# Patient Record
Sex: MALE | Race: OTHER | Employment: FULL TIME | URBAN - METROPOLITAN AREA
[De-identification: names, ages, dates, MRNs, and addresses within clinical notes are randomized per-mention and may not be internally consistent; named-entity substitution may affect disease eponyms.]

---

## 2019-10-03 ENCOUNTER — HOSPITAL ENCOUNTER (EMERGENCY)
Age: 54
Discharge: HOME | End: 2019-10-03
Attending: EMERGENCY MEDICINE

## 2019-10-03 ENCOUNTER — APPOINTMENT (OUTPATIENT)
Dept: GENERAL RADIOLOGY | Age: 54
End: 2019-10-03

## 2019-10-03 VITALS
HEART RATE: 76 BPM | TEMPERATURE: 98.2 F | DIASTOLIC BLOOD PRESSURE: 81 MMHG | OXYGEN SATURATION: 96 % | WEIGHT: 200 LBS | HEIGHT: 67 IN | SYSTOLIC BLOOD PRESSURE: 153 MMHG | BODY MASS INDEX: 31.39 KG/M2 | RESPIRATION RATE: 16 BRPM

## 2019-10-03 DIAGNOSIS — J40 BRONCHITIS: Primary | ICD-10-CM

## 2019-10-03 PROCEDURE — 71046 X-RAY EXAM CHEST 2 VIEWS: CPT

## 2019-10-03 PROCEDURE — 99283 EMERGENCY DEPT VISIT LOW MDM: CPT

## 2019-10-03 PROCEDURE — 6370000000 HC RX 637 (ALT 250 FOR IP): Performed by: EMERGENCY MEDICINE

## 2019-10-03 RX ORDER — ALBUTEROL SULFATE 90 UG/1
1-2 AEROSOL, METERED RESPIRATORY (INHALATION) EVERY 4 HOURS PRN
Qty: 1 INHALER | Refills: 0 | Status: SHIPPED | OUTPATIENT
Start: 2019-10-03

## 2019-10-03 RX ORDER — BENZONATATE 100 MG/1
100 CAPSULE ORAL 3 TIMES DAILY PRN
Qty: 30 CAPSULE | Refills: 0 | Status: SHIPPED | OUTPATIENT
Start: 2019-10-03 | End: 2019-10-10

## 2019-10-03 RX ORDER — BENZONATATE 100 MG/1
100 CAPSULE ORAL ONCE
Status: COMPLETED | OUTPATIENT
Start: 2019-10-03 | End: 2019-10-03

## 2019-10-03 RX ORDER — OMEPRAZOLE 20 MG/1
20 CAPSULE, DELAYED RELEASE ORAL DAILY
COMMUNITY

## 2019-10-03 RX ORDER — PREDNISONE 10 MG/1
TABLET ORAL
Qty: 20 TABLET | Refills: 0 | Status: SHIPPED | OUTPATIENT
Start: 2019-10-03 | End: 2019-10-13

## 2019-10-03 RX ORDER — PREDNISONE 20 MG/1
60 TABLET ORAL ONCE
Status: COMPLETED | OUTPATIENT
Start: 2019-10-03 | End: 2019-10-03

## 2019-10-03 RX ORDER — AZITHROMYCIN 250 MG/1
TABLET, FILM COATED ORAL
Qty: 1 PACKET | Refills: 0 | Status: SHIPPED | OUTPATIENT
Start: 2019-10-03 | End: 2019-10-13

## 2019-10-03 RX ADMIN — PREDNISONE 60 MG: 20 TABLET ORAL at 11:35

## 2019-10-03 RX ADMIN — BENZONATATE 100 MG: 100 CAPSULE ORAL at 11:35

## 2019-10-03 ASSESSMENT — ENCOUNTER SYMPTOMS
DIARRHEA: 0
NAUSEA: 0
TROUBLE SWALLOWING: 0
BLOOD IN STOOL: 0
VOMITING: 0
CONSTIPATION: 0
BACK PAIN: 0
SORE THROAT: 0
ABDOMINAL PAIN: 0
COLOR CHANGE: 0
SHORTNESS OF BREATH: 1
COUGH: 1

## 2022-02-21 ENCOUNTER — APPOINTMENT (OUTPATIENT)
Dept: GENERAL RADIOLOGY | Age: 57
End: 2022-02-21

## 2022-02-21 ENCOUNTER — HOSPITAL ENCOUNTER (EMERGENCY)
Age: 57
Discharge: HOME | End: 2022-02-21
Attending: EMERGENCY MEDICINE

## 2022-02-21 VITALS
HEART RATE: 86 BPM | TEMPERATURE: 97 F | WEIGHT: 200 LBS | RESPIRATION RATE: 16 BRPM | BODY MASS INDEX: 31.39 KG/M2 | OXYGEN SATURATION: 98 % | DIASTOLIC BLOOD PRESSURE: 90 MMHG | HEIGHT: 67 IN | SYSTOLIC BLOOD PRESSURE: 152 MMHG

## 2022-02-21 DIAGNOSIS — S43.402A SPRAIN OF LEFT SHOULDER, UNSPECIFIED SHOULDER SPRAIN TYPE, INITIAL ENCOUNTER: ICD-10-CM
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
## 2022-02-21 PROCEDURE — 96372 THER/PROPH/DIAG INJ SC/IM: CPT

## 2022-02-21 PROCEDURE — 73030 X-RAY EXAM OF SHOULDER: CPT

## 2022-02-21 PROCEDURE — 99283 EMERGENCY DEPT VISIT LOW MDM: CPT

## 2022-02-21 PROCEDURE — 6360000002 HC RX W HCPCS: Performed by: PHYSICIAN ASSISTANT

## 2022-02-21 RX ORDER — IBUPROFEN 800 MG/1
800 TABLET ORAL EVERY 8 HOURS PRN
Qty: 20 TABLET | Refills: 0 | Status: SHIPPED | OUTPATIENT
Start: 2022-02-21

## 2022-02-21 RX ORDER — KETOROLAC TROMETHAMINE 30 MG/ML
30 INJECTION, SOLUTION INTRAMUSCULAR; INTRAVENOUS ONCE
Status: COMPLETED | OUTPATIENT
Start: 2022-02-21 | End: 2022-02-21

## 2022-02-21 RX ADMIN — KETOROLAC TROMETHAMINE 30 MG: 30 INJECTION, SOLUTION INTRAMUSCULAR at 11:20

## 2022-02-21 ASSESSMENT — PAIN SCALES - GENERAL: PAINLEVEL_OUTOF10: 7

## 2022-02-21 ASSESSMENT — PAIN - FUNCTIONAL ASSESSMENT: PAIN_FUNCTIONAL_ASSESSMENT: 0-10

## 2022-02-21 NOTE — Clinical Note
Guillermo Roth was seen and treated in our emergency department on 2/21/2022. He may return to work on 02/23/2022. If you have any questions or concerns, please don't hesitate to call.       Kassy Villafana PA-C

## 2022-02-21 NOTE — ED PROVIDER NOTES
[address] ED  eMERGENCY dEPARTMENT eNCOUnter   Independent Attestation     Pt Name: Brea Mendes  MRN: [id number]  [place] 1965  Date of evaluation: 2/21/22   Brea Mendes is a 61 y.o. male who presents with Shoulder Pain (left) and Fall    Vitals:   Vitals:    02/21/22 1002   BP: (!) 152/90   Pulse: 86   Resp: 16   Temp: 97 °F (36.1 °C)   TempSrc: Temporal   SpO2: 98%   Weight: 200 lb (90.7 kg)   Height: 5' 7\" (1.702 m)     Impression:   1. Fall, initial encounter    2. Sprain of left shoulder, unspecified shoulder sprain type, initial encounter      I was personally available for consultation in the Emergency Department. I have reviewed the chart and agree with the documentation as recorded by the [facility], including the assessment, treatment plan and disposition.   Tod Ramirez MD  Attending Emergency  Physician                  Tod Ramirez MD  02/21/22 5540

## 2022-02-21 NOTE — LETTER
[facility] ED  [address] [address]  Phone: [phone]             February 21, 2022    Patient: Godfrey Bledsoe   YOB: 1965   Date of Visit: 2/21/2022       To Whom It May Concern:    Sapphire Crawford was seen and treated in our emergency department on 2/21/2022. He may return to work 2/22/22.       Sincerely,             Signature:__________________________________

## 2022-02-21 NOTE — ED PROVIDER NOTES
[address] ED  eMERGENCY dEPARTMENT eNCOUnter      Pt Name: Cherylene Ear  MRN: [id number]  [place] 1965  Date of evaluation: 2/21/2022  Provider: Hua Ahmadi PA-C    CHIEF COMPLAINT       Chief Complaint   Patient presents with    Shoulder Pain     left    Fall           HISTORY OF PRESENT ILLNESS  (Location/Symptom, Timing/Onset, Context/Setting, Quality, Duration, Modifying Factors, Severity.)   Cherylene Ear is a 61 y.o. male who presents to the emergency department for evaluation of left shoulder pain. Pt states he was walking down his porch steps this morning and slipped on ice falling onto left arm. Denies hitting head or loc. Reports pain in upper arm and shoulder. States it is anterior. Reports difficulty lifting arm. Denies numbness, head injury, neck pain, back pain, chest pain, sob, nausea, emesis, abd pain. Pt has no other complaints. He is right handed. Nursing Notes were reviewed. REVIEW OF SYSTEMS    (2-9 systems for level 4, 10 or more for level 5)     Review of Systems   Shoulder pain  Arm pain   Fall       Except as noted above the remainder of the review of systems was reviewed and negative. PAST MEDICAL HISTORY   History reviewed. No pertinent past medical history.   None otherwise stated in nurses notes    SURGICAL HISTORY       Past Surgical History:   Procedure Laterality Date    FRACTURE SURGERY       None otherwise stated in nurses notes    CURRENT MEDICATIONS       Discharge Medication List as of 2/21/2022 11:22 AM      CONTINUE these medications which have NOT CHANGED    Details   omeprazole (PRILOSEC) 20 MG delayed release capsule Take 20 mg by mouth dailyHistorical Med      albuterol sulfate HFA (PROVENTIL HFA) 108 (90 Base) MCG/ACT inhaler Inhale 1-2 puffs into the lungs every 4 hours as needed for Wheezing or Shortness of Breath (Space out to every 6 hours as symptoms improve) Space out to every 6 hours as symptoms improve., Disp-1 Inhaler, R-0Print             ALLERGIES     Patient has no known allergies. FAMILY HISTORY     History reviewed. No pertinent family history. No family status information on file. None otherwise stated in nurses notes    SOCIAL HISTORY      reports that he has been smoking cigarettes. He has a 30.00 pack-year smoking history. He has never used smokeless tobacco. He reports current alcohol use. He reports previous drug use. lives at home with others     PHYSICAL EXAM    (up to 7 for level 4, 8 or more for level 5)     ED Triage Vitals [02/21/22 1002]   BP Temp Temp Source Pulse Resp SpO2 Height Weight   (!) 152/90 97 °F (36.1 °C) Temporal 86 16 98 % 5' 7\" (1.702 m) 200 lb (90.7 kg)       Physical Exam   Nursing note and vitals reviewed. Constitutional: Oriented to person, place, and time and well-developed, well-nourished. Head: Normocephalic and atraumatic. Ear: External ears normal.   Nose: Nose normal and midline. Eyes: Conjunctivae and EOM are normal. Pupils are equal, round, and reactive to light. Neck: Normal range of motion. Neck supple. No tenderness. Cardiovascular: Normal rate, regular rhythm, normal heart sounds and intact distal pulses. Pulmonary/Chest: Effort normal and breath sounds normal. No respiratory distress. No wheezes. No rales. No rhonchi. No chest tenderness. Musculoskeletal: examination of left arm reveals no visible deformities, bruising, swelling, abrasions, erythema. There is tenderness over anterior humeral head and proximal humerus. There is no pain over AC or scapula. There is limited ROM with abduction to 30 degrees. Good  strength. Brisk cap refill. 2/2 radial pulse. Distal sensation intact. No elbow tenderness. Neurological: Alert and oriented to person, place, and time. GCS score is 15. Skin: Skin is warm and dry. No rash noted. No erythema. No pallor.    Psychiatric: Mood, memory, affect and judgment normal.           DIAGNOSTIC RESULTS EKG: All EKG's are interpreted by the Emergency Department Physician who either signs or Co-signs this chart in the absence of a cardiologist.        RADIOLOGY:   All plain film, CT, MRI, and formal ultrasound images (except ED bedside ultrasound) are read by the radiologist, see reports below, unless otherwise noted in MDM or here. XR SHOULDER LEFT (MIN 2 VIEWS)   Final Result   Mild degenerative changes of the left AC and glenohumeral joints. No acute   fracture or dislocation. XR ELBOW LEFT (MIN 3 VIEWS)    (Results Pending)       XR SHOULDER LEFT (MIN 2 VIEWS)    Result Date: 2/21/2022  EXAMINATION: 3 XRAY VIEWS OF THE LEFT SHOULDER 2/21/2022 10:41 am COMPARISON: None. HISTORY: ORDERING SYSTEM PROVIDED HISTORY: fall TECHNOLOGIST PROVIDED HISTORY: fall Reason for Exam: anterior shoulder pain after fall today 50-year-old male with anterior left shoulder pain after a fall today FINDINGS: Mild degenerative changes of the left AC and glenohumeral joints. Visualized left-sided ribs appear intact. No acute fracture or dislocation. Mild degenerative changes of the left AC and glenohumeral joints. No acute fracture or dislocation. LABS:  Labs Reviewed - No data to display    All other labs were within normal range or not returned as of this dictation.     EMERGENCY DEPARTMENT COURSE and DIFFERENTIAL DIAGNOSIS/MDM:   Vitals:    Vitals:    02/21/22 1002   BP: (!) 152/90   Pulse: 86   Resp: 16   Temp: 97 °F (36.1 °C)   TempSrc: Temporal   SpO2: 98%   Weight: 200 lb (90.7 kg)   Height: 5' 7\" (1.702 m)         Patient instructed to return to the emergency room if symptoms worsen, return, or any other concern right away which is agreed by the patient    ED MEDS:  Orders Placed This Encounter   Medications    ketorolac (TORADOL) injection 30 mg    ibuprofen (ADVIL;MOTRIN) 800 MG tablet     Sig: Take 1 tablet by mouth every 8 hours as needed for Pain     Dispense:  20 tablet     Refill:  0 CONSULTS:  None    PROCEDURES:  None      FINAL IMPRESSION      1. Fall, initial encounter    2. Sprain of left shoulder, unspecified shoulder sprain type, initial encounter          DISPOSITION/PLAN   DISPOSITION     PATIENT REFERRED TO:  [facility] SURGERY  [place] [facility] [address]  [address] [address]  [phone]  Call       [facility] ED  [address] [id number]  [facility] [facility] [address] [phone]    Call         DISCHARGE MEDICATIONS:  Discharge Medication List as of 2/21/2022 11:22 AM      START taking these medications    Details   ibuprofen (ADVIL;MOTRIN) 800 MG tablet Take 1 tablet by mouth every 8 hours as needed for Pain, Disp-20 tablet, R-0Print               Summation      Patient Course:    Fall on ice. Pain over left shoulder. PMS. Limited ROM. Imaging shows no acute bony abnormalities. Suspect possible rotator cuff injury. Will place in sling and refer to ortho. Discussed results and plan with the pt. They expressed appropriate understanding. Pt given close follow up, supportive care instructions and strict return instructions at the bedside. The care is provided during an unprecedented national emergency due to the novel coronavirus, COVID-19.       ED Medications administered this visit:    Medications   ketorolac (TORADOL) injection 30 mg (30 mg IntraMUSCular Given 2/21/22 1120)       New Prescriptions from this visit:    Discharge Medication List as of 2/21/2022 11:22 AM      START taking these medications    Details   ibuprofen (ADVIL;MOTRIN) 800 MG tablet Take 1 tablet by mouth every 8 hours as needed for Pain, Disp-20 tablet, R-0Print             Follow-up:  [facility] SURGERY  [place] [facility] [address]  [address] [address]  [phone]  Call       [facility] ED  [address] [address]  [address] [place] [address] [place] [address]    Call           Final Impression:   1. Fall, initial encounter    2.  Sprain of left shoulder, unspecified shoulder sprain type, initial encounter               (Please note that portions of this note were completed with a voice recognition program )        Nita Valdivia 81, PA-C  02/21/22 1147